# Patient Record
Sex: FEMALE | Race: OTHER
[De-identification: names, ages, dates, MRNs, and addresses within clinical notes are randomized per-mention and may not be internally consistent; named-entity substitution may affect disease eponyms.]

---

## 2019-12-04 ENCOUNTER — HOSPITAL ENCOUNTER (OUTPATIENT)
Dept: HOSPITAL 74 - FASU-ENDO | Age: 24
Discharge: HOME | End: 2019-12-04
Attending: INTERNAL MEDICINE
Payer: COMMERCIAL

## 2019-12-04 VITALS — HEART RATE: 87 BPM

## 2019-12-04 VITALS — TEMPERATURE: 97.9 F

## 2019-12-04 VITALS — SYSTOLIC BLOOD PRESSURE: 120 MMHG | DIASTOLIC BLOOD PRESSURE: 70 MMHG

## 2019-12-04 VITALS — BODY MASS INDEX: 30.9 KG/M2

## 2019-12-04 DIAGNOSIS — K29.50: Primary | ICD-10-CM

## 2019-12-04 DIAGNOSIS — K31.89: ICD-10-CM

## 2019-12-04 DIAGNOSIS — K29.80: ICD-10-CM

## 2019-12-04 PROCEDURE — 0DB68ZX EXCISION OF STOMACH, VIA NATURAL OR ARTIFICIAL OPENING ENDOSCOPIC, DIAGNOSTIC: ICD-10-PCS | Performed by: INTERNAL MEDICINE

## 2019-12-04 PROCEDURE — 0DB58ZX EXCISION OF ESOPHAGUS, VIA NATURAL OR ARTIFICIAL OPENING ENDOSCOPIC, DIAGNOSTIC: ICD-10-PCS | Performed by: INTERNAL MEDICINE

## 2019-12-04 PROCEDURE — 0DB98ZX EXCISION OF DUODENUM, VIA NATURAL OR ARTIFICIAL OPENING ENDOSCOPIC, DIAGNOSTIC: ICD-10-PCS | Performed by: INTERNAL MEDICINE

## 2019-12-06 NOTE — PATH
Surgical Pathology Report



Patient Name:  MASHA GATICA

Accession #:  [id number]

Med. Rec. #:  [id number]                                                        

   /Age/Gender:  1995 (Age: 24) / F

Account:  [id number]                                                          

             Location: [facility]

Taken:  2019

Received:  2019

Reported:  2019

Physicians:  Blessing Brenner M.D.

  



Specimen(s) Received

A: SECOND PORTION DUODENUM 

B: ANTRUM & THICKENED PRE-PYLORI FOLD 

C: GE JUNCTION 





Clinical History

Abdominal pain

Postoperative diagnosis: Duodenitis, gastritis, thickened fold







Final Diagnosis

A. SECOND PORTION DUODENUM, BIOPSY:

DUODENUM MUCOSA WITH NO SIGNIFICANT PATHOLOGIC CHANGE.

NO HISTOLOGIC EVIDENCE OF CELIAC DISEASE.



B. GASTRIC ANTRUM AND THICKENED PRE-PYLORIC FOLD, BIOPSY:

GASTRIC MUCOSA WITH CHRONIC GASTRITIS.

REACTIVE GASTROPATHY PRESENT.

IMMUNOSTAIN FOR H. PYLORI IS NEGATIVE.

NEGATIVE FOR INTESTINAL METAPLASIA.



C. GE JUNCTION, BIOPSY:

COLUMNAR (GASTRIC) / SQUAMOUS MUCOSA WITH NO SIGNIFICANT PATHOLOGIC CHANGE.

NEGATIVE FOR INTESTINAL METAPLASIA.







***Electronically Signed***

Serena Abreu M.D.





Gross Description

A.  Received in formalin, labeled "biopsy second portion of duodenum" is a tan,

irregular portion of soft tissue measuring 0.3 cm. in greatest dimension. The

specimen is submitted in toto in one cassette.



B.  Received in formalin, labeled "biopsy gastric antrum "" is a tan, irregular

portion of soft tissue measuring 0.5 cm. in greatest dimension. The specimen is

submitted in toto in one cassette.



C.  Received in formalin, labeled "biopsy GE junction" is a tan, irregular

portion of soft tissue measuring 0.6 cm. in greatest dimension. The specimen is

submitted in toto in one cassette.





[facility]